# Patient Record
Sex: MALE | Employment: OTHER | URBAN - METROPOLITAN AREA
[De-identification: names, ages, dates, MRNs, and addresses within clinical notes are randomized per-mention and may not be internally consistent; named-entity substitution may affect disease eponyms.]

---

## 2019-01-17 ENCOUNTER — OFFICE VISIT (OUTPATIENT)
Dept: FAMILY MEDICINE CLINIC | Facility: HOSPITAL | Age: 44
End: 2019-01-17

## 2019-01-17 VITALS
BODY MASS INDEX: 31.27 KG/M2 | WEIGHT: 199.2 LBS | HEART RATE: 88 BPM | SYSTOLIC BLOOD PRESSURE: 122 MMHG | DIASTOLIC BLOOD PRESSURE: 82 MMHG | TEMPERATURE: 96 F | HEIGHT: 67 IN

## 2019-01-17 DIAGNOSIS — J06.9 UPPER RESPIRATORY TRACT INFECTION, UNSPECIFIED TYPE: Primary | ICD-10-CM

## 2019-01-17 PROCEDURE — 99202 OFFICE O/P NEW SF 15 MIN: CPT | Performed by: FAMILY MEDICINE

## 2019-01-17 RX ORDER — AMOXICILLIN 500 MG/1
500 CAPSULE ORAL EVERY 8 HOURS SCHEDULED
Qty: 30 CAPSULE | Refills: 0 | Status: SHIPPED | OUTPATIENT
Start: 2019-01-17 | End: 2019-01-27

## 2019-01-17 NOTE — PROGRESS NOTES
[facility]  [facility] [address] [facility]  [address], [address]  Tel: [phone]      Assessment/Plan:    Problem List Items Addressed This Visit     None      Visit Diagnoses     Upper respiratory tract infection, unspecified type    -  Primary    Relevant Medications    amoxicillin (AMOXIL) 500 mg capsule        One month of Uri sytmpoms  Will treat empirically with amoxil and monitor sytmpoms  To call next week if not improving or worsening  To schedule for physical once financially able ( due to insurance)    _____________________________________________________________________    History of Present Illness:     Patient is here for an acute visit      Cough; Shortness of Breath; Wheezing; and Establish Care    1 month of coughing  Initially with wheezing and sob  The sob has somewhat improved but still gets winded  The coughing improves then recurs  Cough is constant and productive  No nasal congestion  No sinus pressure  No ear pain  Had a CXR in November for immigration purposes  Reports getting about 5 vaccines for immigration purposes and sytmpoms appear to have started at that time  -----------------------------  Review of Systems   Constitutional: Negative  Negative for activity change, appetite change, chills, diaphoresis, fatigue and fever  HENT: Negative for congestion, dental problem, rhinorrhea, sinus pain, sinus pressure and sore throat  Respiratory: Positive for cough and shortness of breath  Negative for apnea, chest tightness and wheezing  Cardiovascular: Negative  Negative for chest pain, palpitations and leg swelling  Gastrointestinal: Negative  Negative for abdominal distention, abdominal pain, constipation, diarrhea and nausea  Genitourinary: Negative  Negative for difficulty urinating, dysuria and frequency         Social Hx reviewed:    Social History     Social History    Marital status: /Civil Union     Spouse name: N/A   Lucy Bell Number of children: N/A    Years of education: N/A     Occupational History    Not on file  Social History Main Topics    Smoking status: Never Smoker    Smokeless tobacco: Never Used    Alcohol use No    Drug use: No    Sexual activity: Not on file     Other Topics Concern    Not on file     Social History Narrative    No narrative on file       History reviewed  No pertinent past medical history  No Known Allergies      Vitals:    01/17/19 0817   BP: 122/82   Pulse: 88   Temp: (!) 96 °F (35 6 °C)     Physical Exam   Constitutional: He is oriented to person, place, and time  He appears well-developed and well-nourished  HENT:   Head: Normocephalic and atraumatic  Right Ear: External ear normal    Left Ear: External ear normal    Nose: Nose normal    Mouth/Throat: Oropharynx is clear and moist  No oropharyngeal exudate  Eyes: Pupils are equal, round, and reactive to light  EOM are normal  Right eye exhibits no discharge  Left eye exhibits no discharge  Neck: Normal range of motion  Neck supple  No JVD present  No tracheal deviation present  No thyromegaly present  Cardiovascular: Normal rate, regular rhythm and normal heart sounds  Exam reveals no gallop and no friction rub  No murmur heard  Pulmonary/Chest: Effort normal and breath sounds normal  No stridor  No respiratory distress  He has no wheezes  He has no rales  He exhibits no tenderness  Abdominal: Soft  Bowel sounds are normal    Lymphadenopathy:     He has no cervical adenopathy  Neurological: He is alert and oriented to person, place, and time  Skin: Skin is warm and dry  Psychiatric: He has a normal mood and affect  His behavior is normal    Nursing note and vitals reviewed  To call our office if any concerns/questions at [phone]

## 2019-01-24 ENCOUNTER — TELEPHONE (OUTPATIENT)
Dept: FAMILY MEDICINE CLINIC | Facility: HOSPITAL | Age: 44
End: 2019-01-24

## 2019-01-24 DIAGNOSIS — J06.9 UPPER RESPIRATORY TRACT INFECTION, UNSPECIFIED TYPE: Primary | ICD-10-CM

## 2019-01-24 RX ORDER — BENZONATATE 100 MG/1
100 CAPSULE ORAL 3 TIMES DAILY PRN
Qty: 20 CAPSULE | Refills: 0 | Status: SHIPPED | OUTPATIENT
Start: 2019-01-24 | End: 2019-11-23

## 2019-01-24 NOTE — TELEPHONE ENCOUNTER
Pt taking Amoxicillin but the cough is still bothering him  Otherwise feeling better  Can you send something for cough?     [facility] Q/T

## 2019-11-23 ENCOUNTER — OFFICE VISIT (OUTPATIENT)
Dept: FAMILY MEDICINE CLINIC | Facility: HOSPITAL | Age: 44
End: 2019-11-23

## 2019-11-23 VITALS
OXYGEN SATURATION: 95 % | DIASTOLIC BLOOD PRESSURE: 90 MMHG | HEART RATE: 78 BPM | WEIGHT: 197.4 LBS | BODY MASS INDEX: 30.98 KG/M2 | HEIGHT: 67 IN | SYSTOLIC BLOOD PRESSURE: 138 MMHG | TEMPERATURE: 96.6 F

## 2019-11-23 DIAGNOSIS — R05.3 CHRONIC COUGH: Primary | ICD-10-CM

## 2019-11-23 PROCEDURE — 99213 OFFICE O/P EST LOW 20 MIN: CPT | Performed by: FAMILY MEDICINE

## 2019-11-23 RX ORDER — TRIAMCINOLONE ACETONIDE 55 UG/1
1 SPRAY, METERED NASAL
Qty: 1 BOTTLE | Refills: 2 | Status: SHIPPED | OUTPATIENT
Start: 2019-11-23

## 2019-11-23 NOTE — PROGRESS NOTES
Assessment/Plan:      Problem List Items Addressed This Visit     None      Visit Diagnoses     Chronic cough    -  Primary    Relevant Medications    Triamcinolone Acetonide 55 MCG/ACT AERO         Suspect more or PND  Trial of nasocort  rx sent to pharmacy and monitor  Will contact in 1-2 weeks  Further imaging or ENT referral if ongoing sytmpoms  Advised increase water intake  Decrease caffeine intake  Monitor sytmpoms  If not improving will recommend seeing Urology  Subjective:   Chief Complaint   Patient presents with    Cough        Patient ID: Ricky Vidal is a 44 y o  male  Ongoing coughing  Worse with change of weather  No sneezing, no watery eyes  With post nasal drip sensation  No choking sensation  Appears worse with hot fluids  No wheezing, no sob, no pnd, no chest pains  No fever, no chills  This has been ongoing for several months  No weight loss  CXr in the past was normal      Also with a sensation of decreased urine  Is a   Worried about prostate [id number]  No nocturia, no hesitancy, no urgency, no frequency, no straining  Urinates 1-2 times a day  Does not feel full  Drinks tea and coffee for the most part  Does drink some water  Cough   Pertinent negatives include no chest pain, chills, shortness of breath or wheezing  The following portions of the patient's history were reviewed and updated as appropriate: allergies, current medications, past family history, past medical history, past social history, past surgical history and problem list     Review of Systems   Constitutional: Negative  Negative for activity change, appetite change, chills and diaphoresis  HENT: Negative for congestion and dental problem  Respiratory: Positive for cough  Negative for apnea, chest tightness, shortness of breath and wheezing  Cardiovascular: Negative  Negative for chest pain, palpitations and leg swelling  Gastrointestinal: Negative  Negative for abdominal distention, abdominal pain, constipation, diarrhea and nausea  Genitourinary: Negative  Negative for difficulty urinating, dysuria and frequency  Objective:  Vitals:    11/23/19 0809   BP: 138/90   Pulse: 78   Temp: (!) 96 6 °F (35 9 °C)   SpO2: 95%   Weight: 89 5 kg (197 lb 6 4 oz)   Height: 5' 7" (1 702 m)     BP Readings from Last 3 Encounters:   11/23/19 138/90   01/17/19 122/82      Wt Readings from Last 3 Encounters:   11/23/19 89 5 kg (197 lb 6 4 oz)   01/17/19 90 4 kg (199 lb 3 2 oz)        No visits with results within 6 Month(s) from this visit  Latest known visit with results is:   No results found for any previous visit    ]       Physical Exam   Constitutional: He is oriented to person, place, and time  He appears well-developed and well-nourished  No distress  HENT:   Head: Normocephalic and atraumatic  Right Ear: External ear normal    Left Ear: External ear normal    Nose: Nose normal    Mouth/Throat: Oropharynx is clear and moist  No oropharyngeal exudate  Eyes: Pupils are equal, round, and reactive to light  Conjunctivae and EOM are normal    Neck: Normal range of motion  Neck supple  Cardiovascular: Normal rate, regular rhythm and normal heart sounds  Exam reveals no gallop and no friction rub  No murmur heard  Pulmonary/Chest: Effort normal and breath sounds normal  No stridor  No respiratory distress  He has no wheezes  He has no rales  He exhibits no tenderness  Abdominal: Soft  Bowel sounds are normal  He exhibits no distension and no mass  There is no tenderness  There is no rebound and no guarding  Genitourinary: Rectum normal, prostate normal and testes normal  Rectal exam shows no external hemorrhoid and no internal hemorrhoid  Musculoskeletal: Normal range of motion  Neurological: He is alert and oriented to person, place, and time  Skin: Skin is warm  Psychiatric: He has a normal mood and affect  His behavior is normal  Judgment and thought content normal    Nursing note and vitals reviewed

## 2019-11-29 ENCOUNTER — TELEPHONE (OUTPATIENT)
Dept: FAMILY MEDICINE CLINIC | Facility: HOSPITAL | Age: 44
End: 2019-11-29

## 2019-11-29 DIAGNOSIS — R05.3 CHRONIC COUGH: Primary | ICD-10-CM

## 2019-11-29 NOTE — TELEPHONE ENCOUNTER
Patient is still coughing, but not as bad  Has only been using the medication for 3 days  Patient is drinking more but hasn't really seen improvement with his urination

## 2019-11-29 NOTE — TELEPHONE ENCOUNTER
----- Message from Colonel Aparicio sent at 11/29/2019 10:11 AM EST -----  LM to call back  ----- Message -----  From: Roscoe Linn MD  Sent: 11/29/2019  To: [facility]     Please call  Started on nasocort last week  See if any improvement with coughing  How is his urination with increaseing water intake  Let me know

## 2019-12-02 ENCOUNTER — TELEPHONE (OUTPATIENT)
Dept: FAMILY MEDICINE CLINIC | Facility: HOSPITAL | Age: 44
End: 2019-12-02

## 2019-12-02 NOTE — TELEPHONE ENCOUNTER
----- Message from Shyann Casillas sent at 11/29/2019 10:11 AM EST -----  LM to call back  ----- Message -----  From: Ishmael Cardona MD  Sent: 11/29/2019  To: [facility]     Please call  Started on nasocort last week  See if any improvement with coughing  How is his urination with increaseing water intake  Let me know

## 2019-12-05 NOTE — TELEPHONE ENCOUNTER
Pt does not want to see Urology - doesn't think this is a problem  More concerned with the cough  Nasal spray has helped just a little bit but does not feel the cough is really improving   Wants to know what else he can do

## 2021-12-29 ENCOUNTER — TELEPHONE (OUTPATIENT)
Dept: FAMILY MEDICINE CLINIC | Facility: HOSPITAL | Age: 46
End: 2021-12-29

## 2022-05-17 ENCOUNTER — OFFICE VISIT (OUTPATIENT)
Dept: FAMILY MEDICINE CLINIC | Facility: HOSPITAL | Age: 47
End: 2022-05-17

## 2022-05-17 VITALS
TEMPERATURE: 98.3 F | HEART RATE: 84 BPM | WEIGHT: 195.2 LBS | BODY MASS INDEX: 30.64 KG/M2 | SYSTOLIC BLOOD PRESSURE: 158 MMHG | DIASTOLIC BLOOD PRESSURE: 98 MMHG | HEIGHT: 67 IN | OXYGEN SATURATION: 98 %

## 2022-05-17 DIAGNOSIS — R09.82 POST-NASAL DRIP: Primary | ICD-10-CM

## 2022-05-17 DIAGNOSIS — J02.9 PHARYNGITIS, UNSPECIFIED ETIOLOGY: ICD-10-CM

## 2022-05-17 PROCEDURE — 99214 OFFICE O/P EST MOD 30 MIN: CPT | Performed by: NURSE PRACTITIONER

## 2022-05-17 RX ORDER — FLUTICASONE PROPIONATE 50 MCG
SPRAY, SUSPENSION (ML) NASAL
Qty: 16 G | Refills: 0 | Status: SHIPPED | OUTPATIENT
Start: 2022-05-17

## 2022-05-17 NOTE — PROGRESS NOTES
Assessment/Plan:    No problem-specific Assessment & Plan notes found for this encounter  Diagnoses and all orders for this visit:    Post-nasal drip  Comments:  chronic - consider allergies vs silent GERD - advise he start nasal steroid as rx'd, daily antihistamine & take PPI regularly as advised by ENT  Orders:  -     fluticasone (FLONASE) 50 mcg/act nasal spray; Use 2 sprays in each nostril at bedtime    Pharyngitis, unspecified etiology  Comments:  chronic - start meds as above & advise he f/u with ENT if sx's persist x1 month or worsen  Orders:  -     fluticasone (FLONASE) 50 mcg/act nasal spray; Use 2 sprays in each nostril at bedtime          Subjective:      Patient ID: Daren Holloway is a 40 y o  male  States he had a scratchy cough for about 6 months  He then developed mucous in throat and saw ENT in January  Was told he had irritation from stomach acid  Was given omeprazole but he did not take because he did not think that was the problem  States he only has acid feeling once in awhile  Was given antibiotic from ENT which did not help  Still has mucous in throat and has to spit it out so he can swallow  Feels like glue in throat  Mucous is clear  No hemoptysis  Non smoker  States wife hears noisy breathing at night when he sleeps  The following portions of the patient's history were reviewed and updated as appropriate: allergies, current medications, past family history, past medical history, past social history, past surgical history and problem list     Review of Systems   Constitutional: Negative for chills and fever  HENT: Positive for postnasal drip and sore throat (x2-3 weeks)  Negative for congestion and rhinorrhea  Respiratory: Negative for cough, chest tightness and shortness of breath  Gastrointestinal: Negative for abdominal pain  Allergic/Immunologic: Negative for environmental allergies           Objective:      /98   Pulse 84   Temp 98 3 °F (36 8 °C)   Ht 5' 7" (1 702 m)   Wt 88 5 kg (195 lb 3 2 oz)   SpO2 98%   BMI 30 57 kg/m²          Physical Exam  Vitals reviewed  Constitutional:       General: He is not in acute distress  Appearance: He is well-developed  HENT:      Head: Normocephalic  Nose: No congestion or rhinorrhea  Mouth/Throat:      Pharynx: Posterior oropharyngeal erythema present  No oropharyngeal exudate  Tonsils: No tonsillar exudate  1+ on the right  1+ on the left  Cardiovascular:      Rate and Rhythm: Normal rate and regular rhythm  Heart sounds: No murmur heard  Pulmonary:      Effort: Pulmonary effort is normal  No respiratory distress  Breath sounds: Normal breath sounds  Comments: No cough  Musculoskeletal:      Cervical back: Neck supple  Lymphadenopathy:      Cervical: No cervical adenopathy  Skin:     General: Skin is warm and dry  Neurological:      General: No focal deficit present  Mental Status: He is alert and oriented to person, place, and time  Psychiatric:         Mood and Affect: Mood is anxious